# Patient Record
(demographics unavailable — no encounter records)

---

## 2025-01-24 NOTE — HISTORY OF PRESENT ILLNESS
[de-identified] : 86-year-old-male with PMHx of CABG in 2013, DM, HTN, and HLD. Presents today for consultation of newly diagnosed multiple myeloma. Patient was initially seen by Dr. Miguel A Porter at [facility]. Patient initially developed acute back pain in June 2024; an MRI at this time showed a compression fracture at L1. The pain was better without surgical intervention until December 2024 when he developed lower back pain again without any precipitating factors. At this time he also became sick with a fever and cough and went to [facility] on 12/18/24 where he was found to have pneumonia, sepsis, compression fracture of L3 and anemia with a hemoglobin of 6.5. When discharged patient was elevated by Dr. Beckford who found that patient had a monoclonal gammopathy with an IgG of 3.5 g and very low IgA and IgM as well as elevated kappa light chain.  Patient had a BMBx with Dr. Porter on 1/15/25.   Family Hx: Father - colon CA  Social Hx: Marital Status:  Children: 5 Employment:  Tobacco: Alcohol: